# Patient Record
Sex: MALE | Race: WHITE
[De-identification: names, ages, dates, MRNs, and addresses within clinical notes are randomized per-mention and may not be internally consistent; named-entity substitution may affect disease eponyms.]

---

## 2021-07-22 NOTE — MRI
MRI LEFT KNEE WITHOUT CONTRAST:

 

Date:  06/22/18 

 

HISTORY:  

M25.562, left knee pain. 

 

COMPARISON:  

None. 

 

FINDINGS:

 

Medial Meniscus:

Intact. 

 

Lateral Meniscus:

Intact. 

 

ACL, PCL, MCL, and LCL: 

Intact. 

 

Extensor Mechanism:

Quadriceps tendon, patella, and patellar tendon are all intact. 

 

Cartilage:

Patellofemoral compartment:  There appears to be some cartilage delamination with incompletely remove
d 20-30% thickness chondral defect measuring 6.0 mm craniocaudad x 5.0 mm in AP dimension. The patell
ar cartilage has low grade fissuring at the patellar apex. 

 

Medial compartment:  There is some low grade chondral delamination along the lateral weightbearing butts
rface of the lateral femoral condyle without displacement. 

 

Lateral Compartment:  Intact. 

 

 

Bones:

No fracture. No malalignment. 

 

Soft Tissues:

There is extensive superficial soft tissue edema of the lateral aspect of the proximal knee. There is
 partial tear of the iliotibial band and an interstitial longitudinal component. This is incompletely
 evaluated and likely extends more proximally and to the side. There is some edema of the vastus late
ralis muscle. 

 

No significant popliteal cyst. 

 

There is a moderate to large joint effusion. The medial patellar plica is mildly thickened. 

 

IMPRESSION: 

1.  There is a focal area of chondral delamination which is incompletely displaced from the medial tr
ochlea measuring 6.0 x 6.0 mm. 

 

2.  Chondral delamination along the lateral weightbearing surface of the medial femoral condyle, whic
h is not displaced. 

 

3.  Moderate joint effusion. 

 

4.  Incomplete longitudinal tear of the IT band, incompletely evaluated, likely extends proximally al
sherrie the thigh. There is edema due to the IT band along the vastus lateralis muscle. 

 

 

POS: [facility]
- - -

## 2022-06-29 ENCOUNTER — HOSPITAL ENCOUNTER (EMERGENCY)
Dept: HOSPITAL 92 - ERS | Age: 47
LOS: 1 days | Discharge: HOME | End: 2022-06-30
Payer: SELF-PAY
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
## 2022-06-29 DIAGNOSIS — S91.331A: Primary | ICD-10-CM

## 2022-06-29 PROCEDURE — 90715 TDAP VACCINE 7 YRS/> IM: CPT

## 2022-06-29 PROCEDURE — 99283 EMERGENCY DEPT VISIT LOW MDM: CPT

## 2022-06-29 PROCEDURE — 90471 IMMUNIZATION ADMIN: CPT
